# Patient Record
Sex: FEMALE | Race: WHITE | Employment: STUDENT | URBAN - METROPOLITAN AREA
[De-identification: names, ages, dates, MRNs, and addresses within clinical notes are randomized per-mention and may not be internally consistent; named-entity substitution may affect disease eponyms.]

---

## 2016-08-08 LAB
CREAT SERPL-MCNC: 0.5 MG/DL (ref 0.1–1.2)
GFR SERPL CREATININE-BSD FRML MDRD: 154 ML/MIN/1.73
GLUCOSE SERPL-MCNC: 109 MG/DL (ref 74–100)
POTASSIUM SERPL-SCNC: 4.6 MMOL/L (ref 3.5–5.3)
TSH SERPL-ACNC: 0.93 MIU/ML (ref 0.5–6)

## 2017-07-10 ENCOUNTER — TRANSFERRED RECORDS (OUTPATIENT)
Dept: HEALTH INFORMATION MANAGEMENT | Facility: CLINIC | Age: 22
End: 2017-07-10

## 2017-07-10 LAB
CREAT SERPL-MCNC: 0.6 MG/DL (ref 0.1–1.2)
GFR SERPL CREATININE-BSD FRML MDRD: 124 ML/MIN/1.73
GLUCOSE SERPL-MCNC: 80 MG/DL (ref 74–100)
POTASSIUM SERPL-SCNC: 4.9 MMOL/L (ref 3.5–5.3)
TSH SERPL-ACNC: 1.33 MIU/ML (ref 0.5–6)

## 2017-08-25 ENCOUNTER — TRANSFERRED RECORDS (OUTPATIENT)
Dept: HEALTH INFORMATION MANAGEMENT | Facility: CLINIC | Age: 22
End: 2017-08-25

## 2017-11-10 ENCOUNTER — TELEPHONE (OUTPATIENT)
Dept: ENDOCRINOLOGY | Facility: CLINIC | Age: 22
End: 2017-11-10

## 2017-11-10 ENCOUNTER — MEDICAL CORRESPONDENCE (OUTPATIENT)
Dept: HEALTH INFORMATION MANAGEMENT | Facility: CLINIC | Age: 22
End: 2017-11-10

## 2017-11-10 ENCOUNTER — TRANSFERRED RECORDS (OUTPATIENT)
Dept: HEALTH INFORMATION MANAGEMENT | Facility: CLINIC | Age: 22
End: 2017-11-10

## 2017-11-10 NOTE — TELEPHONE ENCOUNTER
To schedulers: Please schedule her for lst available endocrine    Janine Beatty MD  Endocrine triage

## 2017-11-10 NOTE — TELEPHONE ENCOUNTER
"----- Message from Jerel Quigley sent at 11/10/2017 11:14 AM CST -----  Regarding: new referral from osvaldo Ghotra pt is being referred by her provider at [facility] for \"Acquired adrenal hyperplasia\" - recs will be forwarded to you.    Thanks  Jerel"

## 2017-11-26 ASSESSMENT — ENCOUNTER SYMPTOMS
VOMITING: 1
ARTHRALGIAS: 1
NAUSEA: 1
BACK PAIN: 1
SORE THROAT: 1
INSOMNIA: 1
SINUS CONGESTION: 1
NECK MASS: 1
HOARSE VOICE: 1

## 2017-12-04 ENCOUNTER — OFFICE VISIT (OUTPATIENT)
Dept: ENDOCRINOLOGY | Facility: CLINIC | Age: 22
End: 2017-12-04

## 2017-12-04 VITALS
DIASTOLIC BLOOD PRESSURE: 88 MMHG | BODY MASS INDEX: 25.87 KG/M2 | HEIGHT: 63 IN | HEART RATE: 94 BPM | SYSTOLIC BLOOD PRESSURE: 132 MMHG | WEIGHT: 146 LBS

## 2017-12-04 DIAGNOSIS — E25.9: Primary | ICD-10-CM

## 2017-12-04 DIAGNOSIS — L68.0 HIRSUTISM: ICD-10-CM

## 2017-12-04 ASSESSMENT — PAIN SCALES - GENERAL: PAINLEVEL: NO PAIN (0)

## 2017-12-04 NOTE — PROGRESS NOTES
Endocrine Consult note    Attending Assessment/Plan :     Late onset CAH by history. We do not have the data that led to the diagnosis.  She notes that the HC treatment has not helped her symptoms.  SNOW for all data prior to diagnosis.      Hirsutism - maximum FG score 6 per my exam today.  Perhaps we can do a better job of treating this since she doesn't believe the HC has helped it.    Janine Beatty MD    Chief complaint:  Jillian is a 22 year old female seen in consultation at the request of Dr Loyda Negron for late onset CAH     HISTORY OF PRESENT ILLNESS  The diagnosis of Late onset CAH was made in [place], 2011.      She recalls that she had excess facial hair. She had regular menses.  She had labs but doesn't recall if she had a stimulation test.   She doesn't think she has had gene tests.    She does think she had ovarian US.  She has been treatment with HC since then.  She is currently on 10 mg AM and 5 mg afternoon (lunch-2 PM) and 5 mg evening (around 9 PM).  She states this did NOT noticeably help the facial hair.  She has bene instructed to take stress dose HC in the event of illness.    At the end of the appt She was able to retrieve the following data from her phone records  7/10/17 at 1150 AM: testosterone 31.3 ng/dl (10-73.2, TSH 1.33, DHEAS 74.2 , Androstenedione 71 ng/dl (), Na 143, K 4.9, Cl 104, C02 26, BUN 15, creatinine 0.6, glucose 80, Ca 10  8/25/17 at 1353 : 17 OH progesterone 149; 17 OH pregnenolone 47    Menarche was age 11-12.  Periods are monthly.  She has never had a pregnancy. She has not tried to get pregnant.      8/24/17: thyroid US: 8 x 6 x 5 mm cystic mass right superior thyroid with central echogenic colloid      REVIEW OF SYSTEMS  Feels normal  Weight is stable- no weight gain on the HC  Energy is OK  Sleep is sometimes an issue - can't get to sleep  Shaves facial hair once/week - around the chin  Acne varies with menstrual cycle  Scalp hair is intact - not  "losing it  Cardiac: negative  Respiratory: negative  GI: negative  Menses monthly; LMP one week ago.   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        Current Outpatient Prescriptions   Medication Sig Dispense Refill     HYDROCORTISONE PO 5 mg         Allergies  No Known Allergies    Family History  family history includes Hypertension in her father; Thyroid Disease in her sister. There is no history of DIABETES, CANCER, or HEART DISEASE.    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  GENERAL :  young woman  In no apparent distress  SKIN: Normal color, normal temperature, texture. Shaved terminal hair on chin.  Terminal hair FG 1-2 linea alba, chine and upper thighs only.   EYES: PERRL, EOMI, No scleral icterus,  No proptosis, conjunctival redness, stare, retraction  MOUTH: Moist, pink; pharynx clear  NECK: No visible masses. No palpable adenopathy, or masses. No carotid bruits.   THYROID:  Not palpable  RESP: Lungs clear to auscultation bilaterally  CARDIAC: Regular rate and rhythm, normal S1 S2, without murmurs, rubs or gallops    ABDOMEN: Normal bowel sounds; soft, nontender, no HSM or masses       NEURO: awake, alert, responds appropriately to questions.  Cranial nerves intact.  Moves all extremities; Gait normal.  No tremor of the outstretched hand.  DTRs  1 /4 ,   EXTREMITIES: No " clubbing, cyanosis or edema.

## 2017-12-04 NOTE — PATIENT INSTRUCTIONS
I will let you know when I get the records.  We want especially the records that led to the diagnosis.     If you don't hear from me by January 1, send me a mychart note -

## 2017-12-04 NOTE — MR AVS SNAPSHOT
After Visit Summary   12/4/2017    Jillian oGod    MRN: [id number]           Patient Information     Date Of Birth          1995        Visit Information        Provider Department      12/4/2017 6:00 PM Janine Beatty MD [facility]        Care Instructions    I will let you know when I get the records.  We want especially the records that led to the diagnosis.     If you don't hear from me by January 1, send me a mychart note -              Follow-ups after your visit        Follow-up notes from your care team     Return in about 6 months (around 6/4/2018).      Your next 10 appointments already scheduled     May 07, 2018  4:20 PM CDT   (Arrive by 4:05 PM)   RETURN ENDOCRINE with MD [facility] ([facility])    [address] [place] [id number] [phone]              Who to contact     Please call your clinic at [phone] to:    Ask questions about your health    Make or cancel appointments    Discuss your medicines    Learn about your test results    Speak to your doctor   If you have compliments or concerns about an experience at your clinic, or if you wish to file a complaint, please contact [facility] Patient Relations at [phone] or email us at [email or web address]         Additional Information About Your Visit        MyChart Information     [employer] gives you secure access to your electronic health record. If you see a primary care provider, you can also send messages to your care team and make appointments. If you have questions, please call your primary care clinic.  If you do not have a primary care provider, please call [phone] and they will assist you.      [employer] is an electronic gateway that provides easy, online access to your medical records. With [employer], you can request a clinic appointment, read your test results, renew a  "prescription or communicate with your care team.     To access your existing account, please contact your [facility] or call [phone] for assistance.        Care EveryWhere ID     This is your Care EveryWhere ID. This could be used by other organizations to access your [facility] medical records  [id number]        Your Vitals Were     Pulse Height BMI (Body Mass Index)             94 1.6 m (5' 3\") 25.86 kg/m2          Blood Pressure from Last 3 Encounters:   12/04/17 132/88    Weight from Last 3 Encounters:   12/04/17 66.2 kg (146 lb)              Today, you had the following     No orders found for display       Primary Care Provider Fax #    Physician No Ref-Primary [phone]       No address on file        Equal Access to Services     MERCEDES HARDIN : Hadii dave genaoo Pavel, waaxda luqadaha, qaybta kaalmada adeegyada, rick husain . So [facility] [phone].    ATENCIÓN: Si habla español, tiene a agustin disposición servicios gratuitos de asistencia lingüística. Llame al [phone].    We comply with applicable federal civil rights laws and [place] laws. We do not discriminate on the basis of race, color, national origin, age, disability, sex, sexual orientation, or gender identity.            Thank you!     Thank you for choosing [facility]  for your care. Our goal is always to provide you with excellent care. Hearing back from our patients is one way we can continue to improve our services. Please take a few minutes to complete the written survey that you may receive in the mail after your visit with us. Thank you!             Your Updated Medication List - Protect others around you: Learn how to safely use, store and throw away your medicines at www.disposemymeds.org.          This list is accurate as of: 12/4/17  6:17 PM.  Always use your most recent med list.                   Brand Name Dispense Instructions for use Diagnosis    " HYDROCORTISONE PO      5 mg

## 2017-12-04 NOTE — NURSING NOTE
Chief Complaint   Patient presents with     Consult     CONGENITAL ADRENAL HYPERPLASIA     Elsi Armstrong CMA

## 2017-12-04 NOTE — LETTER
12/4/2017       RE: Jillian Good  [id number]   [place] [address]     Dear Colleague,    Thank you for referring your patient, Jillian Good, to the [facility] END[facility] at [facility]. Please see a copy of my visit note below.    Endocrine Consult note    Attending Assessment/Plan :     Late onset CAH by history. We do not have the data that led to the diagnosis.  She notes that the HC treatment has not helped her symptoms.  SNOW for all data prior to diagnosis.      Hirsutism - maximum FG score 6 per my exam today.  Perhaps we can do a better job of treating this since she doesn't believe the HC has helped it.    Janine Beatty MD    Chief complaint:  Jillian is a 22 year old female seen in consultation at the request of Dr Loyda Negron for late onset CAH     HISTORY OF PRESENT ILLNESS  The diagnosis of Late onset CAH was made in [place], 2011.      She recalls that she had excess facial hair. She had regular menses.  She had labs but doesn't recall if she had a stimulation test.   She doesn't think she has had gene tests.    She does think she had ovarian US.  She has been treatment with HC since then.  She is currently on 10 mg AM and 5 mg afternoon (lunch-2 PM) and 5 mg evening (around 9 PM).  She states this did NOT noticeably help the facial hair.  She has bene instructed to take stress dose HC in the event of illness.    At the end of the appt She was able to retrieve the following data from her phone records  7/10/17 at 1150 AM: testosterone 31.3 ng/dl (10-73.2, TSH 1.33, DHEAS 74.2 , Androstenedione 71 ng/dl (), Na 143, K 4.9, Cl 104, C02 26, BUN 15, creatinine 0.6, glucose 80, Ca 10  8/25/17 at 1353 : 17 OH progesterone 149; 17 OH pregnenolone 47    Menarche was age 11-12.  Periods are monthly.  She has never had a pregnancy. She has not tried to get pregnant.      8/24/17: thyroid US: 8 x 6 x 5 mm cystic mass right superior  "thyroid with central echogenic colloid      REVIEW OF SYSTEMS  Feels normal  Weight is stable- no weight gain on the HC  Energy is OK  Sleep is sometimes an issue - can't get to sleep  Shaves facial hair once/week - around the chin  Acne varies with menstrual cycle  Scalp hair is intact - not losing it  Cardiac: negative  Respiratory: negative  GI: negative  Menses monthly; LMP one week ago.   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        Current Outpatient Prescriptions   Medication Sig Dispense Refill     HYDROCORTISONE PO 5 mg         Allergies  No Known Allergies    Family History  family history includes Hypertension in her father; Thyroid Disease in her sister. There is no history of DIABETES, CANCER, or HEART DISEASE.    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  GENERAL :  young woman  In no apparent distress  SKIN: Normal color, normal temperature, texture. Shaved terminal hair on chin.  Terminal hair FG 1-2 linea alba, chine and upper thighs only.   EYES: PERRL, EOMI, No scleral icterus,  No proptosis, conjunctival redness, stare, retraction  MOUTH: Moist, pink; pharynx clear  NECK: No visible masses. No palpable adenopathy, or masses. No carotid bruits.   THYROID:  Not palpable  RESP: Lungs clear to auscultation bilaterally  CARDIAC: Regular rate and rhythm, normal S1 S2, " without murmurs, rubs or gallops    ABDOMEN: Normal bowel sounds; soft, nontender, no HSM or masses       NEURO: awake, alert, responds appropriately to questions.  Cranial nerves intact.  Moves all extremities; Gait normal.  No tremor of the outstretched hand.  DTRs  1 /4 ,   EXTREMITIES: No clubbing, cyanosis or edema.          Janine Beatty MD

## 2017-12-05 PROBLEM — L68.0 HIRSUTISM: Status: ACTIVE | Noted: 2017-12-05

## 2017-12-20 NOTE — PROGRESS NOTES
"  Review of paper records from [facility] care-- I will send it all to scan on the EMR    Provider notes  11/27/13-- follow up visit with Dr Aparicio- \"genetic testing was done and verified\".  She was started on HC 10/5/5 for 21 hydroxylase deficiency.    8/4/14   827/15   8/8/116   7/10/17     Lab reports:   8/4/14: DHEAS 141.8 (50.7-413.8 mcg/dl), testosterone 34 (2-45 ng/dl), androstenedione 108 ng/dl , 17 OH progesterone 119 ng/dl  8/27/15: testosterone 35.5,17 OH progesterone 228 ng/dl, androstenedione 117  8/8/16: 17 OH progesterone 154 ng/dl, androstenedione 79 ng/dl   7/10/17; 17 OH pregnenolone 47 ng/dl, androstenedione 71 ng/dL        "

## 2018-01-28 ENCOUNTER — HEALTH MAINTENANCE LETTER (OUTPATIENT)
Age: 23
End: 2018-01-28

## 2018-05-06 ASSESSMENT — ENCOUNTER SYMPTOMS
NERVOUS/ANXIOUS: 0
DEPRESSION: 0
DECREASED CONCENTRATION: 0
INSOMNIA: 1
PANIC: 0

## 2018-05-07 ENCOUNTER — OFFICE VISIT (OUTPATIENT)
Dept: ENDOCRINOLOGY | Facility: CLINIC | Age: 23
End: 2018-05-07
Payer: COMMERCIAL

## 2018-05-07 VITALS
HEIGHT: 63 IN | HEART RATE: 96 BPM | SYSTOLIC BLOOD PRESSURE: 145 MMHG | DIASTOLIC BLOOD PRESSURE: 89 MMHG | WEIGHT: 143.3 LBS | BODY MASS INDEX: 25.39 KG/M2

## 2018-05-07 DIAGNOSIS — L68.0 HIRSUTISM: ICD-10-CM

## 2018-05-07 DIAGNOSIS — E25.9: Primary | ICD-10-CM

## 2018-05-07 RX ORDER — HYDROCORTISONE 5 MG/1
TABLET ORAL
Qty: 360 TABLET | Refills: 3 | Status: SHIPPED | OUTPATIENT
Start: 2018-05-07 | End: 2018-06-27

## 2018-05-07 NOTE — PROGRESS NOTES
Endocrine Consult note    Attending Assessment/Plan :     Late onset CAH by history, reportedly diagnosed by a gene test . She is on HC for this, for unclear indication despite the reported gene test.  SNOW again to try to get the gene test and labs that might have preceded the HC treatment.  I have counseled her on my concern that I am still not convinced of the diagnosis in the absence of supporting data.  RTC 6 months to keep this investigation moving.  Refilled Rx for HC     Hirsutism - minimal, not grossly apparent.      Janine Beatty MD      Cc/ HISTORY OF PRESENT ILLNESS  22 year old woman returns for follow up of  Late onset CAH.  I have seen her once previously 12/17.  At that time we did not have the records supporting the diagnosis.     Subsequently we received provider notes referring to genetic testing confirming the diagnosis of 21 hydroxylase deficiency.     She was started on HC 10/5/15 for this..  Menarche was age 11-12.  Periods are monthly.  She has never had a pregnancy. She has not tried to get pregnant.    She recalls that she had excess facial hair. She had regular menses.       Lab reports:   8/4/14: DHEAS 141.8 (50.7-413.8 mcg/dl), testosterone 34 (2-45 ng/dl), androstenedione 108 ng/dl , 17 OH progesterone 119 ng/dl  8/27/15: testosterone 35.5,17 OH progesterone 228 ng/dl, androstenedione 117  8/8/16: 17 OH progesterone 154 ng/dl, androstenedione 79 ng/dl   7/10/17; 17 OH pregnenolone 47 ng/dl,   7/10/17 at 1150 AM: testosterone 31.3 ng/dl (10-73.2, TSH 1.33, DHEAS 74.2 , Androstenedione 71 ng/dl (), Na 143, K 4.9, Cl 104, C02 26, BUN 15, creatinine 0.6, glucose 80, Ca 10  8/25/17 at 1353 : 17 OH progesterone 149; 17 OH pregnenolone 47       She is currently on 10 mg AM and 5 mg afternoon (lunch-2 PM) and 5 mg evening (around 9 PM).  She states this did NOT noticeably help the facial hair.  She has bene instructed to take stress dose HC in the event of illness.    8/24/17: thyroid  "US: 8 x 6 x 5 mm cystic mass right superior thyroid with central echogenic colloid      REVIEW OF SYSTEMS  Darker hair on neck and chin -shaves every 3-5 days  Variable acne with menses  Monthly menses.       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 There is no history of DIABETES, CANCER, or HEART DISEASE.    Social History  Social History   Substance Use Topics     Smoking status: Not on file     Smokeless tobacco: Not on file     Alcohol use Not on file       Physical Exam  /89 (BP Location: Right arm)  Pulse 96  Ht 1.6 m (5' 2.99\")  Wt 65 kg (143 lb 4.8 oz)  BMI 25.39 kg/m2  Body mass index is 25.39 kg/(m^2).  GENERAL :  young woman  In no apparent distress  SKIN: Normal color, normal temperature, texture. Shaved terminal hair on chin.    EYES: PER, EOMI, No scleral icterus,  No proptosis, conjunctival redness, stare, retraction  NECK: No visible masses. No palpable adenopathy, or masses.   THYROID:  Not palpable  RESP: Lungs clear to auscultation bilaterally  CARDIAC: Regular rate and rhythm, normal S1 S2, without murmurs, rubs or gallops        NEURO: awake, alert, responds appropriately to questions.  Moves all extremities; Gait normal.  No tremor of the outstretched hand.   EXTREMITIES: No clubbing, cyanosis or edema.              "

## 2018-05-07 NOTE — PATIENT INSTRUCTIONS
Check in with me in about one month and see if we got the additional requested records/ gene test results.    In the meantime, keep your medicine as is.    Thank you for choosing [facility] for your health care needs. Below is some information for patients who are interested in having their follow-up visit with a physician by telephone. In some cases, a telephone visit can be an effective and convenient way to manage your follow-up care. Choosing a telephone visit rather than a face to face visit for your follow-up care is a decision that you and your physician can make together to ensure it meets all of your needs.  A face to face visit is always an available option, if you choose to do so.     We want to make sure you have all of the information you need about the telephone visit option and answer all of your questions before you decide to schedule a telephone follow-up visit. If you have any questions, you may talk to a staff member or our financial counselor at [phone].    1.  General overview  Our clinic sees patients for a variety of conditions and concerns. A face to face visit with your doctor is required for any new concerns or for your initial visit. If you and your doctor decide that a follow up visit by telephone is appropriate, you may decide to opt for a telephone visit.     2.  Billing and insurance coverage  There is a charge for telephone visits, similar to the charge for an in-person visit. Your bill is based on the amount of time you and your physician are on the phone. We will bill each visit to your insurance company (just like your other medical visits), and you will be responsible for any costs not paid by your insurance company. The charge may be denied by your insurance company, in which case you will be responsible for the entire amount billed. The decision to cover the cost is determined by your insurance company. If you want to know what your insurance company  will cover, we encourage you to contact them to determine your coverage. The codes below are the codes we use when billing for telephone visits and the associated charges. This may help you work with your insurance company to determine your benefits.   Billing CPT codes for Telephone visits    52042 ($30), 51131 ($35), 10758 ($40)     3. Updating your consent form  You may get contacted by a staff member about updating your consent form. If it needs updating prior to your telephone visit, please visit the link below, print it and fill it out and return it to us at [facility], Mail Code [id number], [address] [place] [address].   [email or web address]

## 2018-05-07 NOTE — LETTER
5/7/2018       RE: Jillian Good  [address]   [facility] [address]     Dear Colleague,    Thank you for referring your patient, Jillian Good, to the [facility] ENDOCRINOLOGY at [facility]. Please see a copy of my visit note below.    Endocrine Consult note    Attending Assessment/Plan :     Late onset CAH by history, reportedly diagnosed by a gene test . She is on HC for this, for unclear indication despite the reported gene test.  SNOW again to try to get the gene test and labs that might have preceded the HC treatment.  I have counseled her on my concern that I am still not convinced of the diagnosis in the absence of supporting data.  RTC 6 months to keep this investigation moving.  Refilled Rx for HC     Hirsutism - minimal, not grossly apparent.      Janine Beatty MD    Cc/ HISTORY OF PRESENT ILLNESS  22 year old woman returns for follow up of  Late onset CAH.  I have seen her once previously 12/17.  At that time we did not have the records supporting the diagnosis.     Subsequently we received provider notes referring to genetic testing confirming the diagnosis of 21 hydroxylase deficiency.     She was started on HC 10/5/15 for this..  Menarche was age 11-12.  Periods are monthly.  She has never had a pregnancy. She has not tried to get pregnant.    She recalls that she had excess facial hair. She had regular menses.       Lab reports:   8/4/14: DHEAS 141.8 (50.7-413.8 mcg/dl), testosterone 34 (2-45 ng/dl), androstenedione 108 ng/dl , 17 OH progesterone 119 ng/dl  8/27/15: testosterone 35.5,17 OH progesterone 228 ng/dl, androstenedione 117  8/8/16: 17 OH progesterone 154 ng/dl, androstenedione 79 ng/dl   7/10/17; 17 OH pregnenolone 47 ng/dl,   7/10/17 at 1150 AM: testosterone 31.3 ng/dl (10-73.2, TSH 1.33, DHEAS 74.2 , Androstenedione 71 ng/dl (), Na 143, K 4.9, Cl 104, C02 26, BUN 15, creatinine 0.6, glucose 80, Ca 10  8/25/17 at 1353 : 17  "OH progesterone 149; 17 OH pregnenolone 47       She is currently on 10 mg AM and 5 mg afternoon (lunch-2 PM) and 5 mg evening (around 9 PM).  She states this did NOT noticeably help the facial hair.  She has bene instructed to take stress dose HC in the event of illness.    8/24/17: thyroid US: 8 x 6 x 5 mm cystic mass right superior thyroid with central echogenic colloid    REVIEW OF SYSTEMS  Darker hair on neck and chin -shaves every 3-5 days  Variable acne with menses  Monthly menses.     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 There is no history of DIABETES, CANCER, or HEART DISEASE.    Social History  Social History   Substance Use Topics     Smoking status: Not on file     Smokeless tobacco: Not on file     Alcohol use Not on file     Physical Exam  /89 (BP Location: Right arm)  Pulse 96  Ht 1.6 m (5' 2.99\")  Wt 65 kg (143 lb 4.8 oz)  BMI 25.39 kg/m2  Body mass index is 25.39 kg/(m^2).  GENERAL :  young woman  In no apparent distress  SKIN: Normal color, normal temperature, texture. Shaved terminal hair on chin.    EYES: PER, EOMI, No scleral icterus,  No proptosis, conjunctival redness, stare, retraction  NECK: No visible masses. No palpable adenopathy, or masses.   THYROID:  Not palpable  RESP: Lungs clear to auscultation bilaterally  CARDIAC: Regular rate and rhythm, normal S1 S2, without murmurs, rubs or gallops        NEURO: awake, alert, " responds appropriately to questions.  Moves all extremities; Gait normal.  No tremor of the outstretched hand.   EXTREMITIES: No clubbing, cyanosis or edema.      Again, thank you for allowing me to participate in the care of your patient.      Sincerely,    Janine Beatty MD

## 2018-05-07 NOTE — MR AVS SNAPSHOT
After Visit Summary   5/7/2018    Jillian Good    MRN: [id number]           Patient Information     Date Of Birth          1995        Visit Information        Provider Department      5/7/2018 4:20 PM Janine Beatty MD [facility]        Today's Diagnoses     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)    -  1    Hirsutism          Care Instructions    Check in with me in about one month and see if we got the additional requested records/ gene test results.    In the meantime, keep your medicine as is.    Thank you for choosing [facility] for your health care needs. Below is some information for patients who are interested in having their follow-up visit with a physician by telephone. In some cases, a telephone visit can be an effective and convenient way to manage your follow-up care. Choosing a telephone visit rather than a face to face visit for your follow-up care is a decision that you and your physician can make together to ensure it meets all of your needs.  A face to face visit is always an available option, if you choose to do so.     We want to make sure you have all of the information you need about the telephone visit option and answer all of your questions before you decide to schedule a telephone follow-up visit. If you have any questions, you may talk to a staff member or our financial counselor at [phone].    1.  General overview  Our clinic sees patients for a variety of conditions and concerns. A face to face visit with your doctor is required for any new concerns or for your initial visit. If you and your doctor decide that a follow up visit by telephone is appropriate, you may decide to opt for a telephone visit.     2.  Billing and insurance coverage  There is a charge for telephone visits, similar to the charge for an in-person visit. Your bill is based on the amount of time you and your  physician are on the phone. We will bill each visit to your insurance company (just like your other medical visits), and you will be responsible for any costs not paid by your insurance company. The charge may be denied by your insurance company, in which case you will be responsible for the entire amount billed. The decision to cover the cost is determined by your insurance company. If you want to know what your insurance company will cover, we encourage you to contact them to determine your coverage. The codes below are the codes we use when billing for telephone visits and the associated charges. This may help you work with your insurance company to determine your benefits.   Billing CPT codes for Telephone visits    96940 ($30), 71938 ($35), 33038 ($40)     3. Updating your consent form  You may get contacted by a staff member about updating your consent form. If it needs updating prior to your telephone visit, please visit the link below, print it and fill it out and return it to us at [facility], Mail Code [id number], [address] [place] [id number].   [email or web address]              Follow-ups after your visit        Your next 10 appointments already scheduled     Nov 09, 2018  9:30 AM CST   Telephone Call with Janine Beatty MD   [facility] ([facility])    [address] [place] [address] [phone]           Note: this is not an onsite visit; there is no need to come to the facility.  Thank you for choosing [facility] for your health care needs. Below is some information for patients who are interested in having their follow-up visit with a physician by telephone. In some cases, a telephone visit can be an effective and convenient way to manage your follow-up care. Choosing a telephone visit rather than a face to face visit for your follow-up care is a decision that you and  your physician can make together to ensure it meets all of your needs.  A face to face visit is always an available option, if you choose to do so.  We want to make sure you have all of the information you need about the telephone visit option and answer all of your questions before you decide to schedule a telephone follow-up visit. If you have any questions, you may talk to a staff member or our financial counselor at [phone].  1. General overview Our clinic sees patients for a variety of conditions and concerns. A face to face visit with your doctor is required for any new concerns or for your initial visit. If you and your doctor decide that a follow up visit by telephone is appropriate, you may decide to opt for a telephone visit.   2. Billing and insurance coverage There is a charge for telephone visits, similar to the charge for an in-person visit. Your bill is based on the amount of time you and your physician are on the phone. We will bill each visit to your insurance company (just like your other medical visits), and you will be responsible for any costs not paid by your insurance company. The charge may be denied by your insurance company, in which case you will be responsible for the entire amount billed. The decision to cover the cost is determined by your insurance company. If you want to know what your insurance company will cover, we encourage you to contact them to determine your coverage. The codes below are the codes we use when billing for telephone visits and the associated charges. This may help you work with your insurance company to determine your benefits.   Billing CPT codes for Telephone visits  46209 ($30), 38600 ($35), 72687 ($40)              Who to contact     Please call your clinic at [phone] to:    Ask questions about your health    Make or cancel appointments    Discuss your medicines    Learn about your test results    Speak to your doctor            Additional  "Information About Your Visit        [employer] Information     [employer] gives you secure access to your electronic health record. If you see a primary care provider, you can also send messages to your care team and make appointments. If you have questions, please call your primary care clinic.  If you do not have a primary care provider, please call [phone] and they will assist you.      Quigo is an electronic gateway that provides easy, online access to your medical records. With [employer], you can request a clinic appointment, read your test results, renew a prescription or communicate with your care team.     To access your existing account, please contact your [facility] or call [phone] for assistance.        Care EveryWhere ID     This is your Care EveryWhere ID. This could be used by other organizations to access your [place] medical records  [id number]        Your Vitals Were     Pulse Height BMI (Body Mass Index)             96 1.6 m (5' 2.99\") 25.39 kg/m2          Blood Pressure from Last 3 Encounters:   05/07/18 145/89   12/04/17 132/88    Weight from Last 3 Encounters:   05/07/18 65 kg (143 lb 4.8 oz)   12/04/17 66.2 kg (146 lb)              Today, you had the following     No orders found for display         Today's Medication Changes          These changes are accurate as of 5/7/18  5:06 PM.  If you have any questions, ask your nurse or doctor.               These medicines have changed or have updated prescriptions.        Dose/Directions    hydrocortisone 5 MG tablet   Commonly known as:  CORTEF   This may have changed:    - how much to take  - additional instructions   Used for:  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)        10 mg AM, 5 mg noon, 5 mg PM   Quantity:  360 tablet   Refills:  3            Where to get your medicines      These medications were sent to [facility] [address] [place], [place] - [address] [facility] AT [place] [address] [facility] [address]    Hours:  24-hours Phone:  [phone]     hydrocortisone 5 MG tablet                Primary Care Provider Fax #    Physician No Ref-Primary [phone]       No address on file        Equal Access to Services     MERCEDES HARDIN : Hadii aad ku hadarpitabrayden Soridge, waaxda luqadaha, qaybta kaalmada adecherie, rick jihanin hayaajael vázquezmilena harrisluis shrestha. So [facility] [phone].    ATENCIÓN: Si habla español, tiene a agustin disposición servicios gratuitos de asistencia lingüística. Llame al [phone].    We comply with applicable federal civil rights laws and [place] laws. We do not discriminate on the basis of race, color, national origin, age, disability, sex, sexual orientation, or gender identity.            Thank you!     Thank you for choosing [facility] ENDOCRIN[facility]  for your care. Our goal is always to provide you with excellent care. Hearing back from our patients is one way we can continue to improve our services. Please take a few minutes to complete the written survey that you may receive in the mail after your visit with us. Thank you!             Your Updated Medication List - Protect others around you: Learn how to safely use, store and throw away your medicines at www.disposemymeds.org.          This list is accurate as of 5/7/18  5:06 PM.  Always use your most recent med list.                   Brand Name Dispense Instructions for use Diagnosis    hydrocortisone 5 MG tablet    CORTEF    360 tablet    10 mg AM, 5 mg noon, 5 mg PM    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)       MULTIVITAMIN ADULT PO       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)

## 2018-05-31 ENCOUNTER — MYC MEDICAL ADVICE (OUTPATIENT)
Dept: ENDOCRINOLOGY | Facility: CLINIC | Age: 23
End: 2018-05-31

## 2018-05-31 NOTE — PROGRESS NOTES
Review of outside records received from [facility].  We had requested genetic testing from [id number]    What we got is the following lab reports  3/25/13: androstenedione 111, testosterone 34 ng/dl (2-45), 17 OH progesterone  133 ng/dl , DHEAS 172.9 (20.9-350 mcg/dL)  11/27/13: androstenedione 119, testosterone 21, 17 OH progesterone 172, DHEAS 137.5 mcg/dl (50.7-413 mcg/dL)  8/4/14: androstene emerson 106, testosterone 34, 17 OH progesterone  119    I note that we did not get what we asked for but we did get more normal lab data which does not support the diagnosis of late onset CAH.    Janine Beatty MD

## 2018-06-27 DIAGNOSIS — E25.9: ICD-10-CM

## 2018-06-27 RX ORDER — HYDROCORTISONE 5 MG/1
TABLET ORAL
Qty: 270 TABLET | Refills: 3 | Status: SHIPPED | OUTPATIENT
Start: 2018-06-27 | End: 2019-10-04

## 2018-07-16 ENCOUNTER — TELEPHONE (OUTPATIENT)
Dept: ENDOCRINOLOGY | Facility: CLINIC | Age: 23
End: 2018-07-16

## 2018-07-16 NOTE — TELEPHONE ENCOUNTER
[facility]    Phone Message    May a detailed message be left on voicemail: yes    Reason for Call: Other: PT is wondering if the rest of her medical records were received.  She sent a PDF through [employer].  Please call the PT back or she said a my chart message is okay.     Action Taken: Message routed to:  [facility] Eloina

## 2018-07-23 DIAGNOSIS — E25.9: ICD-10-CM

## 2018-07-23 LAB
ACTH PLAS-MCNC: 41 PG/ML
CORTIS SERPL-MCNC: 14.2 UG/DL (ref 4–22)

## 2018-08-20 ENCOUNTER — TELEPHONE (OUTPATIENT)
Dept: ENDOCRINOLOGY | Facility: CLINIC | Age: 23
End: 2018-08-20

## 2018-08-20 NOTE — TELEPHONE ENCOUNTER
[facility]    Phone Message    May a detailed message be left on voicemail: no    Reason for Call: Other: PT would like a call back to reschedule telephone appt. with Dr. Beatty.  Please follow up with the PT.     Action Taken: Message routed to:  [facility] Eloina

## 2018-08-24 ENCOUNTER — VIRTUAL VISIT (OUTPATIENT)
Dept: ENDOCRINOLOGY | Facility: CLINIC | Age: 23
End: 2018-08-24
Payer: COMMERCIAL

## 2018-08-24 DIAGNOSIS — Z79.52 ON CORTICOSTEROID THERAPY: Primary | ICD-10-CM

## 2018-08-24 DIAGNOSIS — L68.0 HIRSUTISM: ICD-10-CM

## 2018-08-24 DIAGNOSIS — E25.9: ICD-10-CM

## 2018-08-24 NOTE — MR AVS SNAPSHOT
After Visit Summary   8/24/2018    Jillian Good    MRN: [id number]           Patient Information     Date Of Birth          1995        Visit Information        Provider Department      8/24/2018 9:30 AM Janine Beatty MD [facility]        Today's Diagnoses     On corticosteroid therapy    -  1    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)        Hirsutism          Care Instructions    Get bone density test               Follow-ups after your visit        Future tests that were ordered for you today     Open Future Orders        Priority Expected Expires Ordered    Dexa hip/pelvis/spine Routine  3/22/2019 8/24/2018            Who to contact     Please call your clinic at [phone] to:    Ask questions about your health    Make or cancel appointments    Discuss your medicines    Learn about your test results    Speak to your doctor            Additional Information About Your Visit        [employer] Information     [employer] gives you secure access to your electronic health record. If you see a primary care provider, you can also send messages to your care team and make appointments. If you have questions, please call your primary care clinic.  If you do not have a primary care provider, please call [phone] and they will assist you.      [employer] is an electronic gateway that provides easy, online access to your medical records. With [employer], you can request a clinic appointment, read your test results, renew a prescription or communicate with your care team.     To access your existing account, please contact your [facility] or call [phone] for assistance.        Care EveryWhere ID     This is your Care EveryWhere ID. This could be used by other organizations to access your [place] medical records  [id number]         Blood Pressure from Last 3 Encounters:   05/07/18 145/89   12/04/17 132/88    Weight  from Last 3 Encounters:   05/07/18 65 kg (143 lb 4.8 oz)   12/04/17 66.2 kg (146 lb)               Primary Care Provider Fax #    Physician No Ref-Primary [phone]       No address on file        Equal Access to Services     MERCEDES WILFRED : Adrien dave hewitt margarito Soevelynali, wajhonnyda luqadaha, qaybta kaalmada adecherie, rick ramirez lameleciojael shrestha. So [facility] [phone].    ATENCIÓN: Si habla español, tiene a agustin disposición servicios gratuitos de asistencia lingüística. Llame al [phone].    We comply with applicable federal civil rights laws and [place] laws. We do not discriminate on the basis of race, color, national origin, age, disability, sex, sexual orientation, or gender identity.            Thank you!     Thank you for choosing [facility] ENDOCRINOLOGY  for your care. Our goal is always to provide you with excellent care. Hearing back from our patients is one way we can continue to improve our services. Please take a few minutes to complete the written survey that you may receive in the mail after your visit with us. Thank you!             Your Updated Medication List - Protect others around you: Learn how to safely use, store and throw away your medicines at www.disposemymeds.org.          This list is accurate as of 8/24/18 10:53 AM.  Always use your most recent med list.                   Brand Name Dispense Instructions for use Diagnosis    hydrocortisone 5 MG tablet    CORTEF    270 tablet    10 mg AM, 5 mg noon    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)       MULTIVITAMIN ADULT PO       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)

## 2018-08-24 NOTE — PROGRESS NOTES
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 She is on HC for this though she had normal cortisol response to ACTH prior to treatment starting in 2011.  Discussed whether or not we are achieving anything with treatment since she hasn't noticed a difference in hirsutism, but the 17 OH prog levels have been more normal.    I have counseled her on the implications of the diagnosis for future offspring - if she was planning pregnancy she might consider genetic counseling and testing of male partner to predict risk to fetus  DXA     Hirsutism -discussed.  This has been minimal on past face to face visits.  Discussed other options for treatment including OCP and spironolactone.     Start time 0938  Stop time 0954  Total time: 16 minutes    Janine Beatty MD      Cc/ HISTORY OF PRESENT ILLNESS  23 year old woman returns for follow up of  Late onset CAH. I have been seeing her since 12/17. We have not had sufficient outside records prior to the visit today to document the diagnosis.  She has now been able to come up with records dating to 2011 which show her presentation then with hirsutism and high 17 OH Progesterone.  The diagnosis was made in [place] with cortrosyn stimulation test, followed by gene testing.  I have reviewed the following records: 9/16/11 Endocrine consult note when she presented with hirsutism and elevated 17 OH prog; 12/5/11 [facility] appt where she was started on hydrocortisone 10/5/5 with recommendation for stress dose with illness.      Menarche was age 11-12.  Periods are monthly.      She is currently on 10 mg AM and 5 mg afternoon .     Lab reports:   6/20/11: LH 2, FSH 0.29, total testosterone 0.33  9/23/11   ACTH 20, testosterone 37, free testosterone 5.6 pg/ml, TSH 1.33, C peptide 1.37, DHEAS 503 , 197 OH Prog 345  10/7/11: cortrosyn stim test:    17 OH prog 285-- 3754-- 3440  Cortisol 15.4 -- 21.2--- 23.8   She was started on HC 10/5/15 for this..  12: 17 OH prog 140, [id number] mutation V281L homozygote (or heterozygote with deletion).    14: DHEAS 141.8 (50.7-413.8 mcg/dl), testosterone 34 (2-45 ng/dl), androstenedione 108 ng/dl , 17 OH progesterone 119 ng/dl  8/27/15: testosterone 35.5,17 OH progesterone 228 ng/dl, androstenedione 117  16: 17 OH progesterone 154 ng/dl, androstenedione 79 ng/dl   7/10/17; 17 OH pregnenolone 47 ng/dl,   7/10/17 at 1150 AM: testosterone 31.3 ng/dl (10-73.2, TSH 1.33, DHEAS 74.2 , Androstenedione 71 ng/dl (), Na 143, K 4.9, Cl 104, C02 26, BUN 15, creatinine 0.6, glucose 80, Ca 10  17 at 1353 : 17 OH progesterone 149; 17 OH pregnenolone 47      17: thyroid US: 8 x 6 x 5 mm cystic mass right superior thyroid with central echogenic colloid      REVIEW OF SYSTEMS  Doing OK  No change in facial hair -she notes chin hair is dark/coarse, she shaves it off about once/week  Acne is mostly before periods  Menses monthly   No plans for pregnancy in near future, possibly never    Past Medical History  Past Medical History:   Diagnosis Date     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)     we don't know if this is due to 21 OH deficiency     Medications    Current Outpatient Prescriptions   Medication Sig Dispense Refill     hydrocortisone (CORTEF) 5 MG tablet 10 mg AM, 5 mg noon 270 tablet 3     Multiple Vitamins-Minerals (MULTIVITAMIN ADULT PO)          Allergies  No Known Allergies    Family History  family history includes Hypertension in her father; Thyroid Disease in her sister. There is no history of Diabetes, Cancer, or HEART DISEASE.    Social History  Social History   Substance Use Topics     Smoking status: Not on file     Smokeless tobacco: Not on file     Alcohol use Not on file       Physical Exam  There were no vitals taken for this visit.  There is no  height or weight on file to calculate BMI.

## 2018-10-05 ENCOUNTER — RADIANT APPOINTMENT (OUTPATIENT)
Dept: BONE DENSITY | Facility: CLINIC | Age: 23
End: 2018-10-05
Payer: COMMERCIAL

## 2018-10-05 DIAGNOSIS — E25.9: ICD-10-CM

## 2018-10-05 DIAGNOSIS — Z79.52 ON CORTICOSTEROID THERAPY: ICD-10-CM

## 2019-04-29 ENCOUNTER — OFFICE VISIT (OUTPATIENT)
Dept: ENDOCRINOLOGY | Facility: CLINIC | Age: 24
End: 2019-04-29
Payer: COMMERCIAL

## 2019-04-29 VITALS
HEART RATE: 80 BPM | BODY MASS INDEX: 28.74 KG/M2 | HEIGHT: 62 IN | DIASTOLIC BLOOD PRESSURE: 77 MMHG | SYSTOLIC BLOOD PRESSURE: 127 MMHG | WEIGHT: 156.2 LBS

## 2019-04-29 DIAGNOSIS — E25.9: ICD-10-CM

## 2019-04-29 DIAGNOSIS — E27.40 ADRENAL INSUFFICIENCY (H): Primary | ICD-10-CM

## 2019-04-29 DIAGNOSIS — Z79.52 ON CORTICOSTEROID THERAPY: ICD-10-CM

## 2019-04-29 DIAGNOSIS — L68.0 HIRSUTISM: ICD-10-CM

## 2019-04-29 ASSESSMENT — PAIN SCALES - GENERAL: PAINLEVEL: NO PAIN (0)

## 2019-04-29 ASSESSMENT — MIFFLIN-ST. JEOR: SCORE: 1411.77

## 2019-04-29 NOTE — ASSESSMENT & PLAN NOTE
This has been minimal.  Unclear how much of her current minimal hirsutism state can be credited to the hydrocortisone.     other options for treatment including OCP and spironolactone.

## 2019-04-29 NOTE — NURSING NOTE
Chief Complaint   Patient presents with     RECHECK     congenital adrenal hyperplasia     Elsi Armstrong CMA

## 2019-04-29 NOTE — PATIENT INSTRUCTIONS
We will plan on a future cortrosyn stimulation test also measuring 17 hydroxy progesterone in the baseline sample at your convenience.   It is OK to wait until fall for this.    Instructions for illness:  Your dose of during good health:  hydrocortisone 10 mg AM, 5 mg noon     Your dose for minor illness (colds, flu)  for 2-3 days, then back to usual maintenance dose: 20 mg three times/day    If you are unable to take your medication because of vomiting, it is important to receive the medication intravenously.  Take the Act O Vial dose intramuscularly  and go to the hosptial    Patients with adrenal insufficiency often wear a medical ID alert bracelet with the diagnosis noted  adrenal insufficiency               \

## 2019-04-29 NOTE — PROGRESS NOTES
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 The V28L mutation is recognized to be associated with nonclassical CAH   [email or web address].org/content/[email or web address]/[email or web address]    She is on HC for this though she had normal cortisol response to ACTH prior to treatment starting in .  Discussed whether or not we are achieving anything with treatment since she hasn't noticed a difference in hirsutism, but the 17 OH prog levels have been more normal.      On corticosteroid therapy  This treatment places her at risk for secondary adrenal insufficiency and therefore shouldn't be stopped without proving she has intact HPA.   Stress dose steroids would also be used for medical stress until she is proven normal.   Rx Act O Vial for prn emergency use - RN to teach her how to use it today.     Hirsutism  This has been minimal.  Unclear how much of her current minimal hirsutism state can be credited to the hydrocortisone.     other options for treatment including OCP and spironolactone.       Janine Beatty MD      Cc/ HISTORY OF PRESENT ILLNESS  23 year old woman returns for follow up of  Late onset nonclassical CAH.    Menarche was age 10-12.      (age 16) she presentation with hirsutism and high 17 OH Progesterone.  On 11 she had 17 OH Prog 345. On 10/7/11 cortrosyn stim test 17 OH prog stimulated from 285 to 3754 to 3440.  On 12 she had : 17 OH prog 140, [id number] mutation V281L homozygote (or heterozygote with deletion).  She was started on hydrocortisone .  Cortisol was not low prior to starting the treatment. She hs continued on the hydrocortisone since then.      I have been seeing Jillian since 2017.  It took several years to retrieve the records documented in the lst paragraph.  Since having more data we  have been questioning if she needs the hydrocortisone or is getting any benefit from it.  Specifically, she has not noted a difference in the hirsutism. The basal 17 OH progresterone levels may be lower on the hydrocortisone than they were prior to treatment.        She is currently on 10 mg AM and 5 mg afternoon .     Lab reports:   11: LH 2, FSH 0.29, total testosterone 0.33  11   ACTH 20, testosterone 37, free testosterone 5.6 pg/ml, TSH 1.33, C peptide 1.37, DHEAS 503 , 197 OH Prog 345  10/7/11: cortrosyn stim test:   17 OH prog 285-- 3754-- 3440  Cortisol 15.4 -- 21.2--- 23.8   She was started on HC 10/5/15 for this..  12: 17 OH prog 140, [id number] mutation V281L homozygote (or heterozygote with deletion).    14: DHEAS 141.8 (50.7-413.8 mcg/dl), testosterone 34 (2-45 ng/dl), androstenedione 108 ng/dl , 17 OH progesterone 119 ng/dl  8/27/15: testosterone 35.5,17 OH progesterone 228 ng/dl, androstenedione 117  16: 17 OH progesterone 154 ng/dl, androstenedione 79 ng/dl   7/10/17; 17 OH pregnenolone 47 ng/dl,   7/10/17 at 1150 AM: testosterone 31.3 ng/dl (10-73.2, TSH 1.33, DHEAS 74.2 , Androstenedione 71 ng/dl (), Na 143, K 4.9, Cl 104, C02 26, BUN 15, creatinine 0.6, glucose 80, Ca 10  17 at 1353 : 17 OH progesterone 149; 17 OH pregnenolone 47      17: thyroid US: 8 x 6 x 5 mm cystic mass right superior thyroid with central echogenic colloid  10/5/18 DXA shows lowest Z-score 0 at the right femoral neck     REVIEW OF SYSTEMS  Shaves hair on chin/neck area 1-2 times/week  Answers for HPI/ROS submitted by the patient on 4/15/2019   General Symptoms: No  Skin Symptoms: No  HENT Symptoms: No  EYE SYMPTOMS: No  HEART SYMPTOMS: No  LUNG SYMPTOMS: No  INTESTINAL SYMPTOMS: No  URINARY SYMPTOMS: No  GYNECOLOGIC SYMPTOMS: No  BREAST SYMPTOMS: No  SKELETAL SYMPTOMS: No  BLOOD SYMPTOMS: No  NERVOUS SYSTEM SYMPTOMS: No  MENTAL HEALTH SYMPTOMS: No    Past Medical History  Past Medical  "History:   Diagnosis Date     Acne 2011     Hirsutism 2010    electrolysis 2011     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) 2011    we don't know if this is due to 21 OH deficiency     Medications    Current Outpatient Medications   Medication Sig Dispense Refill     hydrocortisone (CORTEF) 5 MG tablet 10 mg AM, 5 mg noon 270 tablet 3     hydrocortisone sodium succinate PF (SOLU-CORTEF) 100 MG injection Inject 2 mLs (100 mg) into the muscle once for 1 dose Dispense Act-O-Vial 2 mL 0     Multiple Vitamins-Minerals (MULTIVITAMIN ADULT PO)        syringe/needle, disp, 23G X 1\" 3 ML MISC Use  Once  With  Actovial in Emergency 3 each 0       Allergies  No Known Allergies    Family History  family history includes Hypertension in her father; Thyroid Disease in her sister.    Social History  Social History     Tobacco Use     Smoking status: Not on file   Substance Use Topics     Alcohol use: Not on file     Drug use: Not on file     upcoming hiking trip in August -     Physical Exam  /77   Pulse 80   Ht 1.575 m (5' 2\")   Wt 70.9 kg (156 lb 3.2 oz)   BMI 28.57 kg/m    Body mass index is 28.57 kg/m .  Young woman in NAD.  No gross hirsutism  No acne  NEURO: awake, alert, responds appropriately.         "

## 2019-04-29 NOTE — ASSESSMENT & PLAN NOTE
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 The V28L mutation is recognized to be associated with nonclassical CAH   [email or web address]/content/[email or web address]    She is on HC for this though she had normal cortisol response to ACTH prior to treatment starting in 2011.  Discussed whether or not we are achieving anything with treatment since she hasn't noticed a difference in hirsutism, but the 17 OH prog levels have been more normal.

## 2019-04-29 NOTE — ASSESSMENT & PLAN NOTE
This treatment places her at risk for secondary adrenal insufficiency and therefore shouldn't be stopped without proving she has intact HPA.   Stress dose steroids would also be used for medical stress until she is proven normal.   Rx Act O Vial for prn emergency use - RN to teach her how to use it today.

## 2019-04-29 NOTE — NURSING NOTE
Jillian Good comes into clinic today at the request of self referred for Adrenal insufficiency    This service provided today was under the supervising provider of the day Dr Beatty, who was available if needed.    Brenda Baez   Teaching Flowsheet   Relevant Diagnosis: Adrenal insufficiency   Teaching Topic: Acto-vial solu- Cortef IM injection  Teach    Person(s) involved in teaching:   Jillian  Motivation Level:  Asks Questions:YES  Eager to Learn:YES did a return  Demonstration with great  Technique.  Cooperative: YES  Receptive (willing/able to accept information):YES  Any cultural factors/Nondenominational beliefs that may influence understanding or compliance?No     Jillian demonstrates understanding of the following:  Reason for the appointment, diagnosis and treatment plan: YES  Knowledge of proper use of medications and conditions for which they are ordered (with special attention to potential side effects or drug interactions):Yes return  Demonstration done and  Asked  Questions  About temperature  To keep it at as she goes back packing.  Which situations necessitate calling provider and whom to contact:YeS  Teaching Concerns Addressed:   Proper use and care of   Acto- Vial  Use with Emergency only   Wound Care: Yes  Hand washing    How and/when to access community resources: Yes If injection is needed she still needs to go to the ER.   Instructional Materials Used/GivenYES     Time spent with patient: 10 minutes.

## 2019-04-29 NOTE — LETTER
2019       RE: Jillian Good  [address]  [place] [address]     Dear Colleague,    Thank you for referring your patient, Jillian Good, to the [facility] ENDOCRINOLOGY at [facility]. Please see a copy of my visit note below.    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 The V28L mutation is recognized to be associated with nonclassical CAH   [email or web address].org/content/[email or web address]/[email or web address]    She is on HC for this though she had normal cortisol response to ACTH prior to treatment starting in .  Discussed whether or not we are achieving anything with treatment since she hasn't noticed a difference in hirsutism, but the 17 OH prog levels have been more normal.      On corticosteroid therapy  This treatment places her at risk for secondary adrenal insufficiency and therefore shouldn't be stopped without proving she has intact HPA.   Stress dose steroids would also be used for medical stress until she is proven normal.   Rx Act O Vial for prn emergency use - RN to teach her how to use it today.     Hirsutism  This has been minimal.  Unclear how much of her current minimal hirsutism state can be credited to the hydrocortisone.     other options for treatment including OCP and spironolactone.     Cc/ HISTORY OF PRESENT ILLNESS  23 year old woman returns for follow up of  Late onset nonclassical CAH.    Menarche was age 10-12.      (age 16) she presentation with hirsutism and high 17 OH Progesterone.  On 11 she had 17 OH Prog 345. On 10/7/11 cortrosyn stim test 17 OH prog stimulated from 285 to 3754 to 3440.  On 12 she had : 17 OH prog 140, [id number] mutation V281L homozygote (or heterozygote with deletion).  She was started on hydrocortisone  .  Cortisol was not low prior to starting the treatment. She hs continued on the hydrocortisone since then.      I have been seeing Jillian since 2017.  It took several years to retrieve the records documented in the lst paragraph.  Since having more data we have been questioning if she needs the hydrocortisone or is getting any benefit from it.  Specifically, she has not noted a difference in the hirsutism. The basal 17 OH progresterone levels may be lower on the hydrocortisone than they were prior to treatment.        She is currently on 10 mg AM and 5 mg afternoon .     Lab reports:   11: LH 2, FSH 0.29, total testosterone 0.33  11   ACTH 20, testosterone 37, free testosterone 5.6 pg/ml, TSH 1.33, C peptide 1.37, DHEAS 503 , 197 OH Prog 345  10/7/11: cortrosyn stim test:   17 OH prog 285-- 3754-- 3440  Cortisol 15.4 -- 21.2--- 23.8   She was started on HC 10/5/15 for this..  12: 17 OH prog 140, [id number] mutation V281L homozygote (or heterozygote with deletion).    14: DHEAS 141.8 (50.7-413.8 mcg/dl), testosterone 34 (2-45 ng/dl), androstenedione 108 ng/dl , 17 OH progesterone 119 ng/dl  8/27/15: testosterone 35.5,17 OH progesterone 228 ng/dl, androstenedione 117  16: 17 OH progesterone 154 ng/dl, androstenedione 79 ng/dl   7/10/17; 17 OH pregnenolone 47 ng/dl,   7/10/17 at 1150 AM: testosterone 31.3 ng/dl (10-73.2, TSH 1.33, DHEAS 74.2 , Androstenedione 71 ng/dl (), Na 143, K 4.9, Cl 104, C02 26, BUN 15, creatinine 0.6, glucose 80, Ca 10  17 at 1353 : 17 OH progesterone 149; 17 OH pregnenolone 47      17: thyroid US: 8 x 6 x 5 mm cystic mass right superior thyroid with central echogenic colloid  10/5/18 DXA shows lowest Z-score 0 at the right femoral neck     REVIEW OF SYSTEMS  Shaves hair on chin/neck area 1-2 times/week    Past Medical History  Past Medical History:   Diagnosis Date     Acne 2011     Hirsutism 2010    electrolysis      Late onset congenital adrenal  "hyperplasia due to 21-hydroxylase deficiency (H) 2011    we don't know if this is due to 21 OH deficiency     Medications    Current Outpatient Medications   Medication Sig Dispense Refill     hydrocortisone (CORTEF) 5 MG tablet 10 mg AM, 5 mg noon 270 tablet 3     hydrocortisone sodium succinate PF (SOLU-CORTEF) 100 MG injection Inject 2 mLs (100 mg) into the muscle once for 1 dose Dispense Act-O-Vial 2 mL 0     Multiple Vitamins-Minerals (MULTIVITAMIN ADULT PO)        syringe/needle, disp, 23G X 1\" 3 ML MISC Use  Once  With  Actovial in Emergency 3 each 0       Allergies  No Known Allergies    Family History  family history includes Hypertension in her father; Thyroid Disease in her sister.    Social History  Social History     Tobacco Use     Smoking status: Not on file   Substance Use Topics     Alcohol use: Not on file     Drug use: Not on file     upcoming hiking trip in August -     Physical Exam  /77   Pulse 80   Ht 1.575 m (5' 2\")   Wt 70.9 kg (156 lb 3.2 oz)   BMI 28.57 kg/m     Body mass index is 28.57 kg/m .  Young woman in NAD.  No gross hirsutism  No acne  NEURO: awake, alert, responds appropriately.     Again, thank you for allowing me to participate in the care of your patient.      Sincerely,    Janine Beatty MD      "

## 2019-05-02 ENCOUNTER — MYC MEDICAL ADVICE (OUTPATIENT)
Dept: ENDOCRINOLOGY | Facility: CLINIC | Age: 24
End: 2019-05-02

## 2019-05-03 NOTE — TELEPHONE ENCOUNTER
Pharmacy Contact     Telephone Fax   [phone]      hydrocortisone sodium succinate PF (SOLU-CORTEF) 100 MG injection    Shelf stable alternative?    Spoke w/ Pharm Kirill BANG, states: they have contacted [employer] and were told they would get a response w/i 2 weeks and they will contact us if a shelf stable alternative is available.   Alicia Nelson RN on 5/3/2019 at 9:50 AM

## 2019-06-24 ENCOUNTER — TELEPHONE (OUTPATIENT)
Dept: ENDOCRINOLOGY | Facility: CLINIC | Age: 24
End: 2019-06-24

## 2019-06-24 NOTE — TELEPHONE ENCOUNTER
[facility] Call Center    Phone Message    May a detailed message be left on voicemail: yes    Reason for Call: Other: Pt calling to schedule a cortrosyn stimulation test. Please call pt back.     Action Taken:ENDO

## 2019-06-24 NOTE — TELEPHONE ENCOUNTER
Please call  Jillian to set up the stim  Test  Ordered by  Dr Beatty. Brenda Baez RN on 6/24/2019 at 5:16 PM

## 2019-06-25 RX ORDER — COSYNTROPIN 0.25 MG/ML
250 INJECTION, POWDER, FOR SOLUTION INTRAMUSCULAR; INTRAVENOUS ONCE
Status: CANCELLED
Start: 2019-06-25

## 2019-06-27 NOTE — TELEPHONE ENCOUNTER
[facility] Call Center    Phone Message    May a detailed message be left on voicemail: yes    Reason for Call: Other: The pt is calling back again to get her test scheduled. Please call her soon to sched. Thanks.     Action Taken: Message routed to:  [facility] ([facility]): jeffry wong

## 2019-07-03 DIAGNOSIS — Z79.52 ON CORTICOSTEROID THERAPY: ICD-10-CM

## 2019-07-03 DIAGNOSIS — E25.9: ICD-10-CM

## 2019-07-15 ENCOUNTER — INFUSION THERAPY VISIT (OUTPATIENT)
Dept: INFUSION THERAPY | Facility: CLINIC | Age: 24
End: 2019-07-15
Attending: INTERNAL MEDICINE
Payer: COMMERCIAL

## 2019-07-15 VITALS
SYSTOLIC BLOOD PRESSURE: 129 MMHG | DIASTOLIC BLOOD PRESSURE: 87 MMHG | HEART RATE: 81 BPM | TEMPERATURE: 98.3 F | RESPIRATION RATE: 16 BRPM

## 2019-07-15 DIAGNOSIS — E25.9: ICD-10-CM

## 2019-07-15 DIAGNOSIS — L68.0 HIRSUTISM: ICD-10-CM

## 2019-07-15 DIAGNOSIS — Z79.52 ON CORTICOSTEROID THERAPY: Primary | ICD-10-CM

## 2019-07-15 LAB
CORTICOSTER 1H P 250 UG ACTH SERPL-SCNC: 20.2 UG/DL
CORTICOSTER 30M P 250 UG ACTH SERPL-SCNC: 20.4 UG/DL
CORTICOSTER SERPL-MCNC: 13.7 UG/DL (ref 4–22)

## 2019-07-15 PROCEDURE — 82634 DEOXYCORTISOL: CPT | Performed by: INTERNAL MEDICINE

## 2019-07-15 PROCEDURE — 84143 ASSAY OF 17-HYDROXYPREGNENO: CPT | Performed by: INTERNAL MEDICINE

## 2019-07-15 PROCEDURE — 83498 ASY HYDROXYPROGESTERONE 17-D: CPT | Performed by: INTERNAL MEDICINE

## 2019-07-15 PROCEDURE — 82533 TOTAL CORTISOL: CPT | Performed by: INTERNAL MEDICINE

## 2019-07-15 PROCEDURE — 25000128 H RX IP 250 OP 636: Mod: ZF | Performed by: INTERNAL MEDICINE

## 2019-07-15 PROCEDURE — 96374 THER/PROPH/DIAG INJ IV PUSH: CPT

## 2019-07-15 RX ORDER — COSYNTROPIN 0.25 MG/ML
250 INJECTION, POWDER, FOR SOLUTION INTRAMUSCULAR; INTRAVENOUS ONCE
Status: CANCELLED
Start: 2019-07-15

## 2019-07-15 RX ORDER — COSYNTROPIN 0.25 MG/ML
250 INJECTION, POWDER, FOR SOLUTION INTRAMUSCULAR; INTRAVENOUS ONCE
Status: COMPLETED | OUTPATIENT
Start: 2019-07-15 | End: 2019-07-15

## 2019-07-15 RX ADMIN — COSYNTROPIN 250 MCG: 0.25 INJECTION, POWDER, LYOPHILIZED, FOR SOLUTION INTRAMUSCULAR; INTRAVENOUS at 08:30

## 2019-07-15 ASSESSMENT — PAIN SCALES - GENERAL: PAINLEVEL: NO PAIN (0)

## 2019-07-15 NOTE — PROGRESS NOTES
Cosyntropin Stimulation Study Nursing Note    Jillian Good comes to [facility] today for a ACTH timed test. Pt's vitals recorded  and entered into flow sheet. Medications recorded on MAR. Access recorded in flow sheet.    Progress Note    Vitals were reviewed     Patient tolerated the procedure well    Note:   RN provided patient with educational handout regarding timed test.  RN confirmed patient did not take steroids (hydrocortisone) on the morning of the test. PIV placed and  baseline labs drawn. RN administered Cortrosyn per IV push followed by 2 mls NS. Cortisol labs drawn again at 30 minutes and 60 minutes. Following test patient instructed to take usual dose of hydrocortisone for the day.    Medication given:  Administrations This Visit     cosyntropin (CORTROSYN) injection 250 mcg     Admin Date  07/15/2019 Action  Given Dose  250 mcg Route  Intravenous Administered By  Dona Graves RN                Discharge Plan    Discharge instructions reviewed with patient: YES  Patient/Representative verbalized understanding, all questions answered: YES    Discharged from unit in stable condition.    Dona Graves RN

## 2019-07-18 LAB
17OH-PREG SERPL-MCNC: 145 NG/DL
17OH-PREG SERPL-MCNC: 465 NG/DL
17OH-PREG SERPL-MCNC: 476 NG/DL

## 2019-07-19 ENCOUNTER — TELEPHONE (OUTPATIENT)
Dept: ENDOCRINOLOGY | Facility: CLINIC | Age: 24
End: 2019-07-19

## 2019-07-19 LAB
11DC SERPL-MCNC: 140 NG/DL
11DC SERPL-MCNC: 143 NG/DL
11DC SERPL-MCNC: 62.1 NG/DL

## 2019-07-19 NOTE — TELEPHONE ENCOUNTER
Prior Authorization Retail Medication Request    Medication/Dose: Solu-cortef  Acto- Vial   Adrrnal insufficiency  Inject 2 ml IM  Once for Emergency purpose  Previously Tried and Failed: daily hydrocortione dose orally   Rationale: This is for when she cannot  Keep her oral medication down and is in adrenal crisis .  Emergency  Self administered dose    Urgent  Needs on hand

## 2019-07-22 LAB
17OHP SERPL-MCNC: 2262 NG/DL
17OHP SERPL-MCNC: 2336 NG/DL
17OHP SERPL-MCNC: 530 NG/DL

## 2019-07-22 NOTE — TELEPHONE ENCOUNTER
PA Initiation    Medication: Solu-Cortef 100MG INJ -   Insurance Company: [employer] - Phone [phone] Fax [phone]  Pharmacy Filling the Rx: [facility] [address] [place] [address] AT [facility]  Filling Pharmacy Phone: [phone]  Filling Pharmacy Fax: [phone]  Start Date: 7/22/2019

## 2019-07-24 NOTE — TELEPHONE ENCOUNTER
Prior Authorization Not Needed per Insurance    Medication: Solu-Cortef 100MG INJ - Not Needed  Insurance Company: [employer] - Phone [phone] Fax [phone]  Expected CoPay:      Pharmacy Filling the Rx: [facility] [address] [place] [address] AT [facility]  Pharmacy Notified: Yes  Patient Notified: Comment:  **Instructed pharmacy to notify patient when script is ready to /ship.**

## 2019-10-04 ENCOUNTER — VIRTUAL VISIT (OUTPATIENT)
Dept: ENDOCRINOLOGY | Facility: CLINIC | Age: 24
End: 2019-10-04
Payer: COMMERCIAL

## 2019-10-04 DIAGNOSIS — E25.9: ICD-10-CM

## 2019-10-04 DIAGNOSIS — Z79.52 ON CORTICOSTEROID THERAPY: Primary | ICD-10-CM

## 2019-10-04 DIAGNOSIS — L68.0 HIRSUTISM: ICD-10-CM

## 2019-10-04 NOTE — PROGRESS NOTES
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 The V28L mutation is recognized to be associated with nonclassical CAH   [email or web address]/[email or web address]     She is on HC for this though she had normal cortisol response to ACTH prior to treatment starting in  and she again had normal cortrosyn stim test .  11 deoxycortisol was high at baseline and on stimulation - this is not an expected component of the 21 hydroxylase block but it wouldn't change the recommendations.    On corticosteroid therapy  This treatment places her at risk for secondary adrenal insufficiency and therefore shouldn't be stopped without proving she has intact HPA.  2019 cortrosyn stim test result was normal.  I had already given her permission to stop HC by she hadn't.  Discussed again in detail today.  OK to stop hydrocortisone now .  Let us know if problems.      Hirsutism  This has been minimal.  Unclear how much of her current minimal hirsutism state can be credited to the hydrocortisone.  This was partial focus of discussion today.     Start time 0902  Stop time 0917  Total time 15 minutes    Janine Beatty MD      Cc/ HISTORY OF PRESENT ILLNESS  24 year old woman returns for follow up of late onset nonclassical CAH.      Menarche was age 10-12.      (age 16) she presentation with hirsutism and high 17 OH Progesterone.  On 11 she had 17 OH Prog 345. On 10/7/11 cortrosyn stim test 17 OH prog stimulated from 285 to 3754 to 3440.  On 12 she had : 17 OH prog 140, [id number] mutation V281L homozygote (or heterozygote with deletion).  She was started on hydrocortisone .  Cortisol was not low prior to starting the treatment. She hs continued on the hydrocortisone since then.      I have been seeing  Jillian since 2017.  It took several years to retrieve the records documented in the lst paragraph.  Since having more data we have been questioning if she needs the hydrocortisone or is getting any benefit from it.  Specifically, she has not noted a difference in the hirsutism. The basal 17 OH progresterone levels may be lower on the hydrocortisone than they were prior to treatment.        At the time of the 19 appt she was still on HC 10 mg AM and 5 mg afternoon .  250 mcg cortrosyn stimulation test  was NORMAL.  After this, I told her she could stop the HC. After this, she sent a [facility] note requesting appt to discuss the results and recommendations.  Today she reports she did NOT stop the hydrocortisone .  She has a number of questions today .      Lab reports:   11: LH 2, FSH 0.29, total testosterone 0.33  11   ACTH 20, testosterone 37, free testosterone 5.6 pg/ml, TSH 1.33, C peptide 1.37, DHEAS 503 , 197 OH Prog 345  10/7/11: cortrosyn stim test:   17 OH prog 285-- 3754-- 3440  Cortisol 15.4 -- 21.2--- 23.8   She was started on HC 10/5/15 for this..  12: 17 OH prog 140, [id number] mutation V281L homozygote (or heterozygote with deletion).    14: DHEAS 141.8 (50.7-413.8 mcg/dl), testosterone 34 (2-45 ng/dl), androstenedione 108 ng/dl , 17 OH progesterone 119 ng/dl  8/27/15: testosterone 35.5,17 OH progesterone 228 ng/dl, androstenedione 117  16: 17 OH progesterone 154 ng/dl, androstenedione 79 ng/dl   7/10/17; 17 OH pregnenolone 47 ng/dl,   7/10/17 at 1150 AM: testosterone 31.3 ng/dl (10-73.2, TSH 1.33, DHEAS 74.2 , Androstenedione 71 ng/dl (), Na 143, K 4.9, Cl 104, C02 26, BUN 15, creatinine 0.6, glucose 80, Ca 10  17 at 1353 : 17 OH progesterone 149; 17 OH pregnenolone 47      17: thyroid US: 8 x 6 x 5 mm cystic mass right superior thyroid with central echogenic colloid  10/5/18 DXA shows lowest Z-score 0 at the right femoral neck       Past Medical History  Past  "Medical History:   Diagnosis Date     Acne 2011     Hirsutism 2010    electrolysis 2011     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  There is no height or weight on file to calculate BMI.      Results for LIZA EDWARDS (MRN [id number]) as of 10/3/2019 21:23   Ref. Range 7/15/2019 08:29 7/15/2019 09:02 7/15/2019 09:32   11-Deoxycortisol Latest Ref Range: <=32.00 ng/dL 62.10 (H) 140.00 (H) 143.00 (H)   17-OH Pregnolone Latest Ref Range: <=226 ng/dL 145 476 (H) 465 (H)   17-OH Progesterone Latest Units: ng/dL 530 2,262 2,336   Cortisol Stimulation Baseline Latest Ref Range: 4 - 22 ug/dL 13.7     Cortisol Stimulation Post 30 Latest Ref Range: >20 ug/dL  20.4    Cortisol Stimulation Post 60 Latest Ref Range: >20 ug/dL   20.2     "

## 2019-10-04 NOTE — PATIENT INSTRUCTIONS
You have my permission to stop the hydrocortisone.    Let us know if problems    I suggest you see me next in 4-6 months.

## 2020-02-10 ENCOUNTER — OFFICE VISIT (OUTPATIENT)
Dept: ENDOCRINOLOGY | Facility: CLINIC | Age: 25
End: 2020-02-10
Payer: COMMERCIAL

## 2020-02-10 VITALS
BODY MASS INDEX: 27.16 KG/M2 | WEIGHT: 147.6 LBS | SYSTOLIC BLOOD PRESSURE: 141 MMHG | HEIGHT: 62 IN | DIASTOLIC BLOOD PRESSURE: 88 MMHG | HEART RATE: 80 BPM

## 2020-02-10 DIAGNOSIS — L68.0 HIRSUTISM: ICD-10-CM

## 2020-02-10 DIAGNOSIS — E25.9: Primary | ICD-10-CM

## 2020-02-10 DIAGNOSIS — Z92.241 HISTORY OF CORTICOSTEROID THERAPY: ICD-10-CM

## 2020-02-10 ASSESSMENT — PAIN SCALES - GENERAL: PAINLEVEL: NO PAIN (0)

## 2020-02-10 ASSESSMENT — MIFFLIN-ST. JEOR: SCORE: 1367.76

## 2020-02-10 NOTE — LETTER
2/10/2020       RE: Jillian Good  [address]  [facility] [address]     Dear Colleague,    Thank you for referring your patient, Jillian Good, to the [facility] ENDOCRINOLOGY at [facility]. Please see a copy of my visit note below.    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 The V28L mutation is recognized to be associated with nonclassical CAH   [email or web address]     History of corticosteroid therapy. She has now been off hydrocortisone since 10/2019 and can't tell a difference.     Hirsutism  This has been minimal.  Labs to include AM androgens and repeat 17 OHProg off HC    Janine Beatty MD      Cc/ HISTORY OF PRESENT ILLNESS  24 year old woman returns for follow up of late onset nonclassical CAH.      Menarche was age 10-12.      (age 16) she presentation with hirsutism and high 17 OH Progesterone.    On 11 she had 17 OH Prog 345.   On 10/7/11 cortrosyn stim test 17 OH prog stimulated from 285 to 3754 to 3440.    On 12 she had : 17 OH prog 140, [id number] mutation V281L homozygote (or heterozygote with deletion).    She was started on hydrocortisone .  Cortisol was not low prior to starting the treatment.   2019 250 mcg cortrosyn stim test normal   I told her she could stop the hydrocortisone at our last appt  Which was 10/4/2019. I also told her this in 2019.      I have been seeing Jillian since 2017.     At the time of the 19 appt she was still on HC 10 mg AM and 5 mg afternoon .  250 mcg cortrosyn stimulation test  was NORMAL.  After this, I told her she could stop the HC. After this, she sent a mychart note requesting appt to discuss the results and recommendations.  Today she  reports she did NOT stop the hydrocortisone .  She has a number of questions today .      Lab reports:   11: LH 2, FSH 0.29, total testosterone 0.33  11   ACTH 20, testosterone 37, free testosterone 5.6 pg/ml, TSH 1.33, C peptide 1.37, DHEAS 503 , 197 OH Prog 345  10/7/11: cortrosyn stim test:   17 OH prog 285-- 3754-- 3440  Cortisol 15.4 -- 21.2--- 23.8   She was started on HC 10/5/15 for this..  12: 17 OH prog 140, [id number] mutation V281L homozygote (or heterozygote with deletion).    14: DHEAS 141.8 (50.7-413.8 mcg/dl), testosterone 34 (2-45 ng/dl), androstenedione 108 ng/dl , 17 OH progesterone 119 ng/dl  8/27/15: testosterone 35.5,17 OH progesterone 228 ng/dl, androstenedione 117  16: 17 OH progesterone 154 ng/dl, androstenedione 79 ng/dl   7/10/17; 17 OH pregnenolone 47 ng/dl,   7/10/17 at 1150 AM: testosterone 31.3 ng/dl (10-73.2, TSH 1.33, DHEAS 74.2 , Androstenedione 71 ng/dl (), Na 143, K 4.9, Cl 104, C02 26, BUN 15, creatinine 0.6, glucose 80, Ca 10  17 at 1353 : 17 OH progesterone 149; 17 OH pregnenolone 47      17: thyroid US: 8 x 6 x 5 mm cystic mass right superior thyroid with central echogenic colloid  10/5/18 DXA shows lowest Z-score 0 at the right femoral neck     ROS  No hirsutism  No change in acne  No change in head hair.   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  There is no height or weight on file to calculate BMI.  Skin: some facial acne (more than I remember but she says it is unchanged); terminal hair linea albs  No alopecia  Thyroid not palpable  LUNGS: clear bilaterally  CARDIAC: RRR S1 S2  ABDOMEN: + BS, soft nontender.        Ref. Range 7/15/2019 08:29 7/15/2019 09:02 7/15/2019 09:32   11-Deoxycortisol Latest Ref Range: <=32.00 ng/dL 62.10 (H) 140.00 (H) 143.00 (H)   17-OH Pregnolone Latest Ref Range: <=226 ng/dL 145 476 (H) 465 (H)   17-OH Progesterone Latest Units: ng/dL 530 2,262 2,336   Cortisol Stimulation Baseline Latest Ref Range: 4 - 22 ug/dL 13.7     Cortisol Stimulation Post 30 Latest Ref Range: >20 ug/dL  20.4    Cortisol Stimulation Post 60 Latest Ref Range: >20 ug/dL   20.2       Again, thank you for allowing me to participate in the care of your patient.      Sincerely,    Janine Beatty MD      "

## 2020-02-10 NOTE — PROGRESS NOTES
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 The V28L mutation is recognized to be associated with nonclassical CAH   [email or web address]     History of corticosteroid therapy. She has now been off hydrocortisone since 10/2019 and can't tell a difference.     Hirsutism  This has been minimal.  Labs to include AM androgens and repeat 17 OHProg off HC    Addendum: labs show slightly raised 17 OHProg, normal A and T.     Janine Beatty MD      Cc/ HISTORY OF PRESENT ILLNESS  24 year old woman returns for follow up of late onset nonclassical CAH.      Menarche was age 10-12.      (age 16) she presentation with hirsutism and high 17 OH Progesterone.    On 11 she had 17 OH Prog 345.   On 10/7/11 cortrosyn stim test 17 OH prog stimulated from 285 to 3754 to 3440.    On 12 she had : 17 OH prog 140, [id number] mutation V281L homozygote (or heterozygote with deletion).    She was started on hydrocortisone .  Cortisol was not low prior to starting the treatment.   2019 250 mcg cortrosyn stim test normal   I told her she could stop the hydrocortisone at our last appt  which was 10/4/2019. I also told her this in 2019. Today she confirms that she did stop the HC.  She  can't tell a difference.     Lab reports:   11: LH 2, FSH 0.29, total testosterone 0.33  11   ACTH 20, testosterone 37, free testosterone 5.6 pg/ml, TSH 1.33, C peptide 1.37, DHEAS 503 , 197 OH Prog 345  10/7/11: cortrosyn stim test:   17 OH prog 285-- 3754-- 3440  Cortisol 15.4 -- 21.2--- 23.8   She was started on HC 10/5/15 for this..  12: 17 OH prog 140, [id number] mutation V281L homozygote (or heterozygote with deletion).    14: DHEAS 141.8 (50.7-413.8 mcg/dl), testosterone 34 (2-45 ng/dl),  "androstenedione 108 ng/dl , 17 OH progesterone 119 ng/dl  8/27/15: testosterone 35.5,17 OH progesterone 228 ng/dl, androstenedione 117  8/8/16: 17 OH progesterone 154 ng/dl, androstenedione 79 ng/dl   7/10/17; 17 OH pregnenolone 47 ng/dl,   7/10/17 at 1150 AM: testosterone 31.3 ng/dl (10-73.2, TSH 1.33, DHEAS 74.2 , Androstenedione 71 ng/dl (), Na 143, K 4.9, Cl 104, C02 26, BUN 15, creatinine 0.6, glucose 80, Ca 10  8/25/17 at 1353 : 17 OH progesterone 149; 17 OH pregnenolone 47      8/24/17: thyroid US: 8 x 6 x 5 mm cystic mass right superior thyroid with central echogenic colloid  10/5/18 DXA shows lowest Z-score 0 at the right femoral neck     ROS  No hirsutism  No change in acne  No change in head hair.   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  There is no height or weight on file to " calculate BMI.  Skin: some facial acne (more than I remember but she says it is unchanged); terminal hair linea albs  No alopecia  Thyroid not palpable  LUNGS: clear bilaterally  CARDIAC: RRR S1 S2  ABDOMEN: + BS, soft nontender.        Ref. Range 7/15/2019 08:29 7/15/2019 09:02 7/15/2019 09:32   11-Deoxycortisol Latest Ref Range: <=32.00 ng/dL 62.10 (H) 140.00 (H) 143.00 (H)   17-OH Pregnolone Latest Ref Range: <=226 ng/dL 145 476 (H) 465 (H)   17-OH Progesterone Latest Units: ng/dL 530 2,262 2,336   Cortisol Stimulation Baseline Latest Ref Range: 4 - 22 ug/dL 13.7     Cortisol Stimulation Post 30 Latest Ref Range: >20 ug/dL  20.4    Cortisol Stimulation Post 60 Latest Ref Range: >20 ug/dL   20.2        Ref. Range 2/12/2020 09:07   17-OH Progesterone Latest Units: ng/dL 336   Androstenedione Latest Ref Range: 0.260 - 2.140 ng/mL 1.463   Testosterone Total Latest Ref Range: 8 - 60 ng/dL 56

## 2020-02-12 DIAGNOSIS — E25.9: ICD-10-CM

## 2020-02-13 LAB — TESTOST SERPL-MCNC: 56 NG/DL (ref 8–60)

## 2020-02-14 LAB — ANDROST SERPL-MCNC: 1.46 NG/ML (ref 0.26–2.14)

## 2020-02-17 LAB — 17OHP SERPL-MCNC: 336 NG/DL

## 2020-03-11 ENCOUNTER — HEALTH MAINTENANCE LETTER (OUTPATIENT)
Age: 25
End: 2020-03-11

## 2020-12-27 ENCOUNTER — HEALTH MAINTENANCE LETTER (OUTPATIENT)
Age: 25
End: 2020-12-27

## 2021-04-25 ENCOUNTER — HEALTH MAINTENANCE LETTER (OUTPATIENT)
Age: 26
End: 2021-04-25

## 2021-10-09 ENCOUNTER — HEALTH MAINTENANCE LETTER (OUTPATIENT)
Age: 26
End: 2021-10-09

## 2022-05-21 ENCOUNTER — HEALTH MAINTENANCE LETTER (OUTPATIENT)
Age: 27
End: 2022-05-21

## 2022-09-17 ENCOUNTER — HEALTH MAINTENANCE LETTER (OUTPATIENT)
Age: 27
End: 2022-09-17

## 2023-06-04 ENCOUNTER — HEALTH MAINTENANCE LETTER (OUTPATIENT)
Age: 28
End: 2023-06-04